# Patient Record
Sex: FEMALE | Race: WHITE | Employment: UNEMPLOYED | URBAN - METROPOLITAN AREA
[De-identification: names, ages, dates, MRNs, and addresses within clinical notes are randomized per-mention and may not be internally consistent; named-entity substitution may affect disease eponyms.]

---

## 2019-01-09 LAB
BASOPHILS ABSOLUTE: 0 K/UL (ref 0–0.2)
BASOPHILS RELATIVE PERCENT: 0.4 %
EOSINOPHILS ABSOLUTE: 0.2 K/UL (ref 0–0.6)
EOSINOPHILS RELATIVE PERCENT: 2.6 %
HCT VFR BLD CALC: 34.7 % (ref 36–48)
HEMOGLOBIN: 11.1 G/DL (ref 12–16)
LYMPHOCYTES ABSOLUTE: 1.9 K/UL (ref 1–5.1)
LYMPHOCYTES RELATIVE PERCENT: 23.3 %
MCH RBC QN AUTO: 27.2 PG (ref 26–34)
MCHC RBC AUTO-ENTMCNC: 31.9 G/DL (ref 31–36)
MCV RBC AUTO: 85.3 FL (ref 80–100)
MONOCYTES ABSOLUTE: 0.4 K/UL (ref 0–1.3)
MONOCYTES RELATIVE PERCENT: 5.2 %
NEUTROPHILS ABSOLUTE: 5.5 K/UL (ref 1.7–7.7)
NEUTROPHILS RELATIVE PERCENT: 68.5 %
PDW BLD-RTO: 16.9 % (ref 12.4–15.4)
PLATELET # BLD: 188 K/UL (ref 135–450)
PMV BLD AUTO: 12.1 FL (ref 5–10.5)
RBC # BLD: 4.07 M/UL (ref 4–5.2)
TSH SERPL DL<=0.05 MIU/L-ACNC: 0.97 UIU/ML (ref 0.27–4.2)
WBC # BLD: 8.1 K/UL (ref 4–11)

## 2019-04-09 RX ORDER — METAXALONE 800 MG/1
800 TABLET ORAL 3 TIMES DAILY
COMMUNITY

## 2019-04-09 RX ORDER — MONTELUKAST SODIUM 10 MG/1
10 TABLET ORAL
COMMUNITY

## 2019-04-09 RX ORDER — OMEGA-3 FATTY ACIDS/FISH OIL 300-1000MG
CAPSULE ORAL DAILY
COMMUNITY

## 2019-04-09 RX ORDER — GABAPENTIN 300 MG/1
600 CAPSULE ORAL 3 TIMES DAILY
COMMUNITY

## 2019-04-09 NOTE — PROGRESS NOTES
Name_______________________________________Printed:____________________  Date and time of surgery__4/12/19 @ 1100_____________________Arrival Time:___0930 main hosp_____________   1. Do not eat or drink anything after 12 midnight (or____hours) prior to surgery. This includes no water, chewing gum or mints. Endoscopy patients follow your doctors bowel prep instructions,which may include taking part of prep after midnight. 2. Take the following pills with a small sip of water on the morning of surgery___singulair__________________________________________________   3. Aspirin, Ibuprofen, Advil, Naproxen, Vitamin E and other Anti-inflammatory products should be stopped for 5 days before surgery or as directed by your physician. 4. Check with your Doctor regarding stopping Plavix, Coumadin,Eliquis, Lovenox,Effient,Pradaxa,Xarelto, Fragmin or other blood thinners and follow their instructions. 5. Do not smoke, and do not drink any alcoholic beverages 24 hours prior to surgery. This includes NA Beer. Refrain from the usage of any recreational drugs. 6. You may brush your teeth and gargle the morning of surgery. DO NOT SWALLOW WATER   7. You MUST make arrangements for a responsible adult to stay on site while you are here and take you home after your surgery. You will not be allowed to leave alone or drive yourself home. It is strongly suggested someone stay with you the first 24 hrs. Your surgery will be cancelled if you do not have a ride home. 8. A parent/legal guardian must accompany a child scheduled for surgery and plan to stay at the hospital until the child is discharged. Please do not bring other children with you. 9. Please wear simple, loose fitting clothing to the hospital.  Ralph Salazarer not bring valuables (money, credit cards, checkbooks, etc.) Do not wear any makeup (including no eye makeup) or nail polish on your fingers or toes. 10. DO NOT wear any jewelry or piercings on day of surgery. All body piercing jewelry must be removed. 11. If you have ___dentures, they will be removed before going to the OR; we will provide you a container. If you wear ___contact lenses or __x_glasses, they will be removed; please bring a case for them. 12. Please see your family doctor/pediatrician for a history & physical and/or concerning medications. Bring any test results/reports from your physician's office. PCP_____wolf_____________Phone___________H&P Appt. Date________             13 If you  have a Living Will and Durable Power of  for Healthcare, please bring in a copy. 15. Notify your Surgeon if you develop any illness between now and surgery  time, cough, cold, fever, sore throat, nausea, vomiting, etc.  Please notify your surgeon if you experience dizziness, shortness of breath or blurred vision between now & the time of your surgery             15. DO NOT shave your operative site 96 hours prior to surgery. For face & neck surgery, men may use an electric razor 48 hours prior to surgery. 16. Shower the night before surgery with __x_Antibacterial soap ___Hibiclens             17. To provide excellent care visitors will be limited to one in the room at any given time. 18.  Please bring picture ID and insurance card. 19.  Visit our web site for additional information:  [email or web address]              20.During flu season no children under the age of 15 are permitted in the hospital for the safety of all patients. 21. If you take a long acting insulin in the evening only  take half of your usual  dose the night  before your procedure              22. If you use a c-pap please bring DOS if staying overnight,             23.For your convenience [facility] has a pharmacy on site to fill your prescriptions.              24. If you use oxygen and have a portable tank please bring it  with you the DOS 22. Bring a complete list of all your medications with name and dose include any supplements. 26. Other__________________________________________   *Please call pre admission testing if you any further questions   [facility]   [address]    [place] [address] [facility]  [phone]   [address] [place] [address]       All above information reviewed with patient in person or by phone. Patient verbalizes understanding. All questions and concerns addressed.                                                                                                  Patient/Rep______pt______________                                                                                                                                    PRE OP INSTRUCTIONS

## 2019-04-12 ENCOUNTER — ANESTHESIA (OUTPATIENT)
Dept: OPERATING ROOM | Age: 39
End: 2019-04-12
Payer: COMMERCIAL

## 2019-04-12 ENCOUNTER — HOSPITAL ENCOUNTER (OUTPATIENT)
Age: 39
Setting detail: OUTPATIENT SURGERY
Discharge: HOME | End: 2019-04-12
Attending: OBSTETRICS & GYNECOLOGY | Admitting: OBSTETRICS & GYNECOLOGY
Payer: COMMERCIAL

## 2019-04-12 ENCOUNTER — ANESTHESIA EVENT (OUTPATIENT)
Dept: OPERATING ROOM | Age: 39
End: 2019-04-12
Payer: COMMERCIAL

## 2019-04-12 VITALS
DIASTOLIC BLOOD PRESSURE: 81 MMHG | BODY MASS INDEX: 45.99 KG/M2 | SYSTOLIC BLOOD PRESSURE: 129 MMHG | TEMPERATURE: 97.8 F | WEIGHT: 293 LBS | HEIGHT: 67 IN | OXYGEN SATURATION: 99 % | RESPIRATION RATE: 16 BRPM | HEART RATE: 76 BPM

## 2019-04-12 VITALS
DIASTOLIC BLOOD PRESSURE: 91 MMHG | OXYGEN SATURATION: 100 % | RESPIRATION RATE: 15 BRPM | TEMPERATURE: 97.2 F | SYSTOLIC BLOOD PRESSURE: 178 MMHG

## 2019-04-12 DIAGNOSIS — G89.18 POSTOPERATIVE PAIN: Primary | ICD-10-CM

## 2019-04-12 LAB — HCG(URINE) PREGNANCY TEST: NEGATIVE

## 2019-04-12 PROCEDURE — 3700000000 HC ANESTHESIA ATTENDED CARE: Performed by: OBSTETRICS & GYNECOLOGY

## 2019-04-12 PROCEDURE — 88305 TISSUE EXAM BY PATHOLOGIST: CPT

## 2019-04-12 PROCEDURE — 6360000002 HC RX W HCPCS: Performed by: NURSE ANESTHETIST, CERTIFIED REGISTERED

## 2019-04-12 PROCEDURE — 7100000000 HC PACU RECOVERY - FIRST 15 MIN: Performed by: OBSTETRICS & GYNECOLOGY

## 2019-04-12 PROCEDURE — 7100000001 HC PACU RECOVERY - ADDTL 15 MIN: Performed by: OBSTETRICS & GYNECOLOGY

## 2019-04-12 PROCEDURE — 2580000003 HC RX 258: Performed by: ANESTHESIOLOGY

## 2019-04-12 PROCEDURE — 3600000014 HC SURGERY LEVEL 4 ADDTL 15MIN: Performed by: OBSTETRICS & GYNECOLOGY

## 2019-04-12 PROCEDURE — 2500000003 HC RX 250 WO HCPCS: Performed by: NURSE ANESTHETIST, CERTIFIED REGISTERED

## 2019-04-12 PROCEDURE — 6370000000 HC RX 637 (ALT 250 FOR IP)

## 2019-04-12 PROCEDURE — 2720000010 HC SURG SUPPLY STERILE: Performed by: OBSTETRICS & GYNECOLOGY

## 2019-04-12 PROCEDURE — 2580000003 HC RX 258: Performed by: OBSTETRICS & GYNECOLOGY

## 2019-04-12 PROCEDURE — 2709999900 HC NON-CHARGEABLE SUPPLY: Performed by: OBSTETRICS & GYNECOLOGY

## 2019-04-12 PROCEDURE — 3600000004 HC SURGERY LEVEL 4 BASE: Performed by: OBSTETRICS & GYNECOLOGY

## 2019-04-12 PROCEDURE — 6360000002 HC RX W HCPCS

## 2019-04-12 PROCEDURE — 7100000010 HC PHASE II RECOVERY - FIRST 15 MIN: Performed by: OBSTETRICS & GYNECOLOGY

## 2019-04-12 PROCEDURE — 3700000001 HC ADD 15 MINUTES (ANESTHESIA): Performed by: OBSTETRICS & GYNECOLOGY

## 2019-04-12 PROCEDURE — 84703 CHORIONIC GONADOTROPIN ASSAY: CPT

## 2019-04-12 PROCEDURE — 6360000002 HC RX W HCPCS: Performed by: OBSTETRICS & GYNECOLOGY

## 2019-04-12 PROCEDURE — 7100000011 HC PHASE II RECOVERY - ADDTL 15 MIN: Performed by: OBSTETRICS & GYNECOLOGY

## 2019-04-12 RX ORDER — SODIUM CHLORIDE 9 MG/ML
INJECTION, SOLUTION INTRAVENOUS CONTINUOUS
Status: DISCONTINUED | OUTPATIENT
Start: 2019-04-12 | End: 2019-04-12 | Stop reason: HOSPADM

## 2019-04-12 RX ORDER — HYDRALAZINE HYDROCHLORIDE 20 MG/ML
5 INJECTION INTRAMUSCULAR; INTRAVENOUS EVERY 10 MIN PRN
Status: DISCONTINUED | OUTPATIENT
Start: 2019-04-12 | End: 2019-04-12 | Stop reason: HOSPADM

## 2019-04-12 RX ORDER — LABETALOL HYDROCHLORIDE 5 MG/ML
5 INJECTION, SOLUTION INTRAVENOUS EVERY 10 MIN PRN
Status: DISCONTINUED | OUTPATIENT
Start: 2019-04-12 | End: 2019-04-12 | Stop reason: HOSPADM

## 2019-04-12 RX ORDER — HYDROMORPHONE HCL 110MG/55ML
0.25 PATIENT CONTROLLED ANALGESIA SYRINGE INTRAVENOUS EVERY 5 MIN PRN
Status: DISCONTINUED | OUTPATIENT
Start: 2019-04-12 | End: 2019-04-12 | Stop reason: HOSPADM

## 2019-04-12 RX ORDER — SODIUM CHLORIDE 0.9 % (FLUSH) 0.9 %
10 SYRINGE (ML) INJECTION PRN
Status: DISCONTINUED | OUTPATIENT
Start: 2019-04-12 | End: 2019-04-12 | Stop reason: HOSPADM

## 2019-04-12 RX ORDER — IBUPROFEN 800 MG/1
800 TABLET ORAL EVERY 6 HOURS PRN
Qty: 40 TABLET | Refills: 3 | Status: SHIPPED | OUTPATIENT
Start: 2019-04-12

## 2019-04-12 RX ORDER — ONDANSETRON 2 MG/ML
4 INJECTION INTRAMUSCULAR; INTRAVENOUS PRN
Status: DISCONTINUED | OUTPATIENT
Start: 2019-04-12 | End: 2019-04-12 | Stop reason: HOSPADM

## 2019-04-12 RX ORDER — HYDROMORPHONE HCL 110MG/55ML
0.5 PATIENT CONTROLLED ANALGESIA SYRINGE INTRAVENOUS EVERY 5 MIN PRN
Status: DISCONTINUED | OUTPATIENT
Start: 2019-04-12 | End: 2019-04-12 | Stop reason: HOSPADM

## 2019-04-12 RX ORDER — SODIUM CHLORIDE 0.9 % (FLUSH) 0.9 %
10 SYRINGE (ML) INJECTION EVERY 12 HOURS SCHEDULED
Status: DISCONTINUED | OUTPATIENT
Start: 2019-04-12 | End: 2019-04-12 | Stop reason: HOSPADM

## 2019-04-12 RX ORDER — PROPOFOL 10 MG/ML
INJECTION, EMULSION INTRAVENOUS PRN
Status: DISCONTINUED | OUTPATIENT
Start: 2019-04-12 | End: 2019-04-12 | Stop reason: SDUPTHER

## 2019-04-12 RX ORDER — SODIUM CHLORIDE, SODIUM LACTATE, POTASSIUM CHLORIDE, CALCIUM CHLORIDE 600; 310; 30; 20 MG/100ML; MG/100ML; MG/100ML; MG/100ML
INJECTION, SOLUTION INTRAVENOUS CONTINUOUS
Status: DISCONTINUED | OUTPATIENT
Start: 2019-04-12 | End: 2019-04-12 | Stop reason: HOSPADM

## 2019-04-12 RX ORDER — LIDOCAINE HYDROCHLORIDE 10 MG/ML
1 INJECTION, SOLUTION EPIDURAL; INFILTRATION; INTRACAUDAL; PERINEURAL
Status: DISCONTINUED | OUTPATIENT
Start: 2019-04-12 | End: 2019-04-12 | Stop reason: HOSPADM

## 2019-04-12 RX ORDER — CIPROFLOXACIN 2 MG/ML
400 INJECTION, SOLUTION INTRAVENOUS ONCE
Status: COMPLETED | OUTPATIENT
Start: 2019-04-12 | End: 2019-04-12

## 2019-04-12 RX ORDER — DEXAMETHASONE SODIUM PHOSPHATE 4 MG/ML
INJECTION, SOLUTION INTRA-ARTICULAR; INTRALESIONAL; INTRAMUSCULAR; INTRAVENOUS; SOFT TISSUE PRN
Status: DISCONTINUED | OUTPATIENT
Start: 2019-04-12 | End: 2019-04-12 | Stop reason: SDUPTHER

## 2019-04-12 RX ORDER — KETOROLAC TROMETHAMINE 30 MG/ML
INJECTION, SOLUTION INTRAMUSCULAR; INTRAVENOUS PRN
Status: DISCONTINUED | OUTPATIENT
Start: 2019-04-12 | End: 2019-04-12 | Stop reason: SDUPTHER

## 2019-04-12 RX ORDER — HYDROMORPHONE HCL 110MG/55ML
PATIENT CONTROLLED ANALGESIA SYRINGE INTRAVENOUS
Status: COMPLETED
Start: 2019-04-12 | End: 2019-04-12

## 2019-04-12 RX ORDER — ONDANSETRON 2 MG/ML
4 INJECTION INTRAMUSCULAR; INTRAVENOUS
Status: DISCONTINUED | OUTPATIENT
Start: 2019-04-12 | End: 2019-04-12 | Stop reason: HOSPADM

## 2019-04-12 RX ORDER — FENTANYL CITRATE 50 UG/ML
INJECTION, SOLUTION INTRAMUSCULAR; INTRAVENOUS PRN
Status: DISCONTINUED | OUTPATIENT
Start: 2019-04-12 | End: 2019-04-12 | Stop reason: SDUPTHER

## 2019-04-12 RX ORDER — LIDOCAINE HYDROCHLORIDE 20 MG/ML
INJECTION, SOLUTION INFILTRATION; PERINEURAL PRN
Status: DISCONTINUED | OUTPATIENT
Start: 2019-04-12 | End: 2019-04-12 | Stop reason: SDUPTHER

## 2019-04-12 RX ORDER — OXYCODONE HYDROCHLORIDE 5 MG/1
5 TABLET ORAL PRN
Status: DISCONTINUED | OUTPATIENT
Start: 2019-04-12 | End: 2019-04-12 | Stop reason: HOSPADM

## 2019-04-12 RX ORDER — FENTANYL CITRATE 50 UG/ML
25 INJECTION, SOLUTION INTRAMUSCULAR; INTRAVENOUS EVERY 5 MIN PRN
Status: DISCONTINUED | OUTPATIENT
Start: 2019-04-12 | End: 2019-04-12 | Stop reason: HOSPADM

## 2019-04-12 RX ORDER — OXYCODONE HYDROCHLORIDE AND ACETAMINOPHEN 5; 325 MG/1; MG/1
TABLET ORAL
Status: COMPLETED
Start: 2019-04-12 | End: 2019-04-12

## 2019-04-12 RX ORDER — OXYCODONE HYDROCHLORIDE AND ACETAMINOPHEN 5; 325 MG/1; MG/1
1 TABLET ORAL ONCE
Status: COMPLETED | OUTPATIENT
Start: 2019-04-12 | End: 2019-04-12

## 2019-04-12 RX ORDER — OXYCODONE HYDROCHLORIDE 5 MG/1
10 TABLET ORAL PRN
Status: DISCONTINUED | OUTPATIENT
Start: 2019-04-12 | End: 2019-04-12 | Stop reason: HOSPADM

## 2019-04-12 RX ORDER — MEPERIDINE HYDROCHLORIDE 25 MG/ML
12.5 INJECTION INTRAMUSCULAR; INTRAVENOUS; SUBCUTANEOUS EVERY 5 MIN PRN
Status: DISCONTINUED | OUTPATIENT
Start: 2019-04-12 | End: 2019-04-12 | Stop reason: HOSPADM

## 2019-04-12 RX ORDER — OXYCODONE HYDROCHLORIDE AND ACETAMINOPHEN 5; 325 MG/1; MG/1
1 TABLET ORAL
Status: DISCONTINUED | OUTPATIENT
Start: 2019-04-12 | End: 2019-04-12 | Stop reason: HOSPADM

## 2019-04-12 RX ORDER — MIDAZOLAM HYDROCHLORIDE 1 MG/ML
INJECTION INTRAMUSCULAR; INTRAVENOUS PRN
Status: DISCONTINUED | OUTPATIENT
Start: 2019-04-12 | End: 2019-04-12 | Stop reason: SDUPTHER

## 2019-04-12 RX ORDER — CIPROFLOXACIN 2 MG/ML
400 INJECTION, SOLUTION INTRAVENOUS
Status: DISCONTINUED | OUTPATIENT
Start: 2019-04-12 | End: 2019-04-12 | Stop reason: HOSPADM

## 2019-04-12 RX ORDER — FENTANYL CITRATE 50 UG/ML
50 INJECTION, SOLUTION INTRAMUSCULAR; INTRAVENOUS EVERY 5 MIN PRN
Status: DISCONTINUED | OUTPATIENT
Start: 2019-04-12 | End: 2019-04-12 | Stop reason: HOSPADM

## 2019-04-12 RX ORDER — OXYCODONE HYDROCHLORIDE AND ACETAMINOPHEN 5; 325 MG/1; MG/1
1 TABLET ORAL EVERY 6 HOURS PRN
Qty: 20 TABLET | Refills: 0 | Status: SHIPPED | OUTPATIENT
Start: 2019-04-12 | End: 2019-04-19

## 2019-04-12 RX ORDER — LIDOCAINE HYDROCHLORIDE 10 MG/ML
0.5 INJECTION, SOLUTION EPIDURAL; INFILTRATION; INTRACAUDAL; PERINEURAL ONCE
Status: DISCONTINUED | OUTPATIENT
Start: 2019-04-12 | End: 2019-04-12 | Stop reason: HOSPADM

## 2019-04-12 RX ORDER — ONDANSETRON 2 MG/ML
INJECTION INTRAMUSCULAR; INTRAVENOUS PRN
Status: DISCONTINUED | OUTPATIENT
Start: 2019-04-12 | End: 2019-04-12 | Stop reason: SDUPTHER

## 2019-04-12 RX ADMIN — PROPOFOL 50 MG: 10 INJECTION, EMULSION INTRAVENOUS at 14:32

## 2019-04-12 RX ADMIN — FENTANYL CITRATE 50 MCG: 50 INJECTION, SOLUTION INTRAMUSCULAR; INTRAVENOUS at 14:35

## 2019-04-12 RX ADMIN — DEXAMETHASONE SODIUM PHOSPHATE 4 MG: 4 INJECTION, SOLUTION INTRAMUSCULAR; INTRAVENOUS at 14:41

## 2019-04-12 RX ADMIN — PROPOFOL 200 MG: 10 INJECTION, EMULSION INTRAVENOUS at 14:28

## 2019-04-12 RX ADMIN — Medication 0.5 MG: at 15:12

## 2019-04-12 RX ADMIN — KETOROLAC TROMETHAMINE 30 MG: 30 INJECTION, SOLUTION INTRAMUSCULAR; INTRAVENOUS at 14:53

## 2019-04-12 RX ADMIN — SODIUM CHLORIDE: 9 INJECTION, SOLUTION INTRAVENOUS at 14:19

## 2019-04-12 RX ADMIN — LIDOCAINE HYDROCHLORIDE 100 MG: 20 INJECTION, SOLUTION INFILTRATION; PERINEURAL at 14:28

## 2019-04-12 RX ADMIN — HYDROMORPHONE HYDROCHLORIDE 0.5 MG: 2 INJECTION, SOLUTION INTRAMUSCULAR; INTRAVENOUS; SUBCUTANEOUS at 15:12

## 2019-04-12 RX ADMIN — SODIUM CHLORIDE, POTASSIUM CHLORIDE, SODIUM LACTATE AND CALCIUM CHLORIDE: 600; 310; 30; 20 INJECTION, SOLUTION INTRAVENOUS at 10:17

## 2019-04-12 RX ADMIN — OXYCODONE AND ACETAMINOPHEN 1 TABLET: 5; 325 TABLET ORAL at 15:52

## 2019-04-12 RX ADMIN — MIDAZOLAM HYDROCHLORIDE 0.5 MG: 1 INJECTION, SOLUTION INTRAMUSCULAR; INTRAVENOUS at 14:35

## 2019-04-12 RX ADMIN — ONDANSETRON 4 MG: 2 INJECTION INTRAMUSCULAR; INTRAVENOUS at 14:41

## 2019-04-12 RX ADMIN — MIDAZOLAM HYDROCHLORIDE 0.5 MG: 1 INJECTION, SOLUTION INTRAMUSCULAR; INTRAVENOUS at 14:19

## 2019-04-12 RX ADMIN — OXYCODONE HYDROCHLORIDE AND ACETAMINOPHEN 1 TABLET: 5; 325 TABLET ORAL at 15:52

## 2019-04-12 RX ADMIN — CIPROFLOXACIN 400 MG: 2 INJECTION, SOLUTION INTRAVENOUS at 13:29

## 2019-04-12 ASSESSMENT — PULMONARY FUNCTION TESTS
PIF_VALUE: 4
PIF_VALUE: 13
PIF_VALUE: 5
PIF_VALUE: 29
PIF_VALUE: 4
PIF_VALUE: 2
PIF_VALUE: 28
PIF_VALUE: 13
PIF_VALUE: 14
PIF_VALUE: 14
PIF_VALUE: 22
PIF_VALUE: 13
PIF_VALUE: 2
PIF_VALUE: 13
PIF_VALUE: 16
PIF_VALUE: 2
PIF_VALUE: 13
PIF_VALUE: 9
PIF_VALUE: 12
PIF_VALUE: 13
PIF_VALUE: 14
PIF_VALUE: 4
PIF_VALUE: 11
PIF_VALUE: 12
PIF_VALUE: 3
PIF_VALUE: 3
PIF_VALUE: 4
PIF_VALUE: 13
PIF_VALUE: 14
PIF_VALUE: 15
PIF_VALUE: 2
PIF_VALUE: 13
PIF_VALUE: 4
PIF_VALUE: 12
PIF_VALUE: 20
PIF_VALUE: 13
PIF_VALUE: 14
PIF_VALUE: 14
PIF_VALUE: 16
PIF_VALUE: 13
PIF_VALUE: 3
PIF_VALUE: 2
PIF_VALUE: 0
PIF_VALUE: 0
PIF_VALUE: 3
PIF_VALUE: 1

## 2019-04-12 ASSESSMENT — PAIN SCALES - GENERAL
PAINLEVEL_OUTOF10: 5
PAINLEVEL_OUTOF10: 4
PAINLEVEL_OUTOF10: 7
PAINLEVEL_OUTOF10: 2
PAINLEVEL_OUTOF10: 7

## 2019-04-12 NOTE — H&P
[place] [address]                     [facility], [address]                              HISTORY AND PHYSICAL    PATIENT NAME: Vinh Miller                      :        1980  MED REC NO:   [id number]                          ROOM:  ACCOUNT NO:   [de-identified]                           ADMIT DATE: 2019  PROVIDER:     Adelita Lynn DO    HISTORY OF PRESENT ILLNESS:  She is a 16-year-old female who is  presenting for NovaSure endometrial ablation, possible hydrothermal  ablation for heavy vaginal bleeding. She is a  3, para 3. She  has had 3  sections. Her bleeding began 2 years ago. She  bleeds anywhere from 1 week to 3 weeks, heavy bright red with clots. The frequency of the bleeding is irregular. Ultrasound in the office  demonstrated the uterus to be 6 x 6 x 5 with a 24 mm endometrial  thickness. Right ovary was unremarkable. Left ovary was not  visualized. MEDICATIONS:  Cytotec, hydrochlorothiazide, metaxalone, lisinopril,  omega-3 release fatty acids, and iron. FAMILY HISTORY:  Remarkable for cervical cancer, coronary artery  disease, hypercholesterolemia, and hypertension. PAST MEDICAL HISTORY:  She has had acid reflux, anemia, dysmenorrhea,  dyspareunia, frequent UTIs, fibroids, hypertension, hyperlipidemia,  renal stones, and menorrhagia. DIAGNOSTIC STUDIES:  She had endometrial biopsies prior to this workup,  which was benign. Saline demonstrated enlarged anteverted uterus with  one small fibroid. Endometrium was 2.4 cm in thickness. Ovaries were  unremarkable. The only thing remarkable about her saline is that  posterior wall of the uterus was thickened. PHYSICAL EXAMINATION:  VITAL SIGNS:  She is 5 feet 6 inches, weighs 353 pounds, blood pressure  is 132/90. GENERAL:  She is morbidly obese. HEENT:  Otherwise unremarkable. LUNGS:  Clear. HEART:  Regular rate and rhythm. ABDOMEN:  Benign.   GENITALIA: Uterus as described on ultrasound. IMPRESSION:  Abnormal vaginal bleeding, obesity, not really a hormone  treatment candidate. PLAN:  She is to undergo endometrial ablation.         Kenney Zamarripa DO    D: 04/11/2019 15:06:04       T: 04/11/2019 18:22:54     JOSE [email or web address]  Job#: [id number]     Doc#: [id number]    CC:

## 2019-04-12 NOTE — PROCEDURES
Department of Obstetrics and Gynecology   Brief Operative Report        Pre-operative Diagnosis:  Menorrhagia, morbid obesity    Post-operative Diagnosis:  Same    Procedure: hysteroscopy, D&C, novasure ablation    Surgeon:  Oswaldo Avery     Assistant(s): none    Anesthesia:  LMA    Findings: see note    Estimated blood loss:  min    Specimens: curettings. Complications:  none    Condition:  stable        See dictated operative report for full details.

## 2019-04-12 NOTE — PROGRESS NOTES
Pt moved to phase II. Pt alert and oriented. Room air. Vss. Pt stable. Pt no longer tearful. Parents at bedside. Pt eating pudding and drinking soda.

## 2019-04-12 NOTE — PROGRESS NOTES
Patient education given and the patient expresses understanding and acceptance of instructions. Kendra Joyner 4/12/2019 3:44 PM  Discharge instructions reviewed with patient/responsible adult. All home medications have been reviewed, questions answered and patient verbalized understanding. Discharge instructions signed and copies given.

## 2019-04-12 NOTE — ANESTHESIA POSTPROCEDURE EVALUATION
Department of Anesthesiology  Postprocedure Note    Patient: Jordin Martinez  MRN: [id number]  YOB: 1980  Date of evaluation: 4/12/2019  Time:  3:10 PM     Procedure Summary     Date:  04/12/19 Room / Location:  [facility] OR  / [facility] OR    Anesthesia Start:  1419 Anesthesia Stop:  1507    Procedure:  Joanna Sprout ENDOMETRIAL ABLATION; DILATATION AND CURETTAGE (N/A Vagina ) Diagnosis:  (N92.0 MENORRHAGIA)    Surgeon:  Erik Elena DO Responsible Provider:  Cristian Daniels DO    Anesthesia Type:  general ASA Status:  3          Anesthesia Type: general    Farzaneh Phase I:      Farzaneh Phase II:      Last vitals: Reviewed and per EMR flowsheets.        Anesthesia Post Evaluation    Patient location during evaluation: PACU  Patient participation: complete - patient participated  Level of consciousness: awake and alert  Airway patency: patent  Nausea & Vomiting: no nausea and no vomiting  Complications: no  Cardiovascular status: blood pressure returned to baseline  Respiratory status: acceptable  Hydration status: euvolemic

## 2019-04-12 NOTE — H&P
I have reviewed the history and physical and examined the patient and find no relevant changes. I have reviewed with the patient and/or family the risks, benefits, and alternatives to the procedure.     Avril Delcid DO  4/12/2019

## 2019-04-12 NOTE — ANESTHESIA PRE PROCEDURE
Department of Anesthesiology  Preprocedure Note       Name:  Josef Duarte   Age:  41 y.o.  :  1980                                          MRN:  [id number]         Date:  2019      Surgeon: Kevin Guerrier):  Avril Delcid DO    Procedure: Lora Bevels ENDOMETRIAL ABLATION; POSSIBLE HYDROTHERMAL ENDOMETRIAL ABLATION (N/A Vagina )    Medications prior to admission:   Prior to Admission medications    Medication Sig Start Date End Date Taking? Authorizing Provider   montelukast (SINGULAIR) 10 MG tablet Take 10 mg by mouth every morning (before breakfast)   Yes Historical Provider, MD   Omega 3 1000 MG CAPS Take by mouth daily   Yes Historical Provider, MD   metaxalone (SKELAXIN) 800 MG tablet Take 800 mg by mouth 3 times daily   Yes Historical Provider, MD   gabapentin (NEURONTIN) 300 MG capsule Take 600 mg by mouth 3 times daily. Yes Historical Provider, MD   lisinopril (PRINIVIL;ZESTRIL) 20 MG tablet Take 10 mg by mouth daily     Historical Provider, MD   hydrochlorothiazide (HYDRODIURIL) 25 MG tablet Take 25 mg by mouth daily. Historical Provider, MD   albuterol (PROVENTIL HFA;VENTOLIN HFA) 108 (90 BASE) MCG/ACT inhaler Inhale 2 puffs into the lungs every 6 hours as needed for Wheezing. Historical Provider, MD   Polysaccharide Iron Complex (PROFE) 391.3 (180 FE) MG CAPS Take 1 capsule by mouth daily. Historical Provider, MD       Current medications:    No current facility-administered medications for this encounter.         Allergies:  No Known Allergies    Problem List:    Patient Active Problem List   Diagnosis Code    Chest pain R07.9    Abnormal nuclear stress test R94.39    Hypertension I10    Tobacco abuse Z72.0       Past Medical History:        Diagnosis Date    Abnormal nuclear stress test     Chest pain     Diabetes mellitus ([facility] [address]     pre    HLD (hyperlipidemia)     Hypertension     Obesity     Tobacco abuse        Past Surgical History:        Procedure Laterality Date    notes reviewed  Airway: Mallampati: II  TM distance: >3 FB   Neck ROM: full  Mouth opening: > = 3 FB Dental: normal exam         Pulmonary:normal exam    (+) asthma:                            Cardiovascular:  Exercise tolerance: poor (<4 METS),   (+) hypertension:, hyperlipidemia                  Neuro/Psych:               GI/Hepatic/Renal:   (+) morbid obesity          Endo/Other:    (+) Diabetes, . Abdominal:   (+) obese,         Vascular:                                        Anesthesia Plan      general     ASA 3       Induction: intravenous. MIPS: Postoperative opioids intended, Prophylactic antiemetics administered and Postoperative trial extubation. Anesthetic plan and risks discussed with patient. Use of blood products discussed with patient whom consented to blood products. Plan discussed with CRNA.                   Geoffrey Ortiz MD   4/12/2019

## 2019-04-13 NOTE — OP NOTE
[address]                     [facility], [address] [place] [address]                                OPERATIVE REPORT    PATIENT NAME: Amauri Wakefeild                      :        1980  MED REC NO:   [id number]                          ROOM:  ACCOUNT NO:   [de-identified]                           ADMIT DATE: 2019  PROVIDER:     Mahi Mcmullen DO    DATE OF PROCEDURE:  2019    PREOPERATIVE DIAGNOSES:  Menorrhagia, morbid obesity. POSTOPERATIVE DIAGNOSES:  Menorrhagia, morbid obesity. OPERATION PERFORMED:  Hysteroscopy, D and C< NovaSure ablation. SURGEON:  Mahi Mcmullen DO    ANESTHESIA:  LMA general.    OPERATIVE PROCEDURE:  After informed consent was signed, this  81-year-old white female was taken to the operative suite, placed in the  dorsal lithotomy position. She had her perineum and vagina  appropriately prepped and draped and her bladder emptied sterilely. A  side opening speculum was placed in the vagina. The anterior cervical  lip was grasped with a single-tooth tenaculum. The cervix was dilated  enough to allow placement of sound. The uterus sounded to 8-9. Hysteroscope was placed. Cavity was difficult to visualize due to the  clots. Curettage was performed. Again the uterus sounded to about 8-9. The NovaSure was placed. The width was 3.5. Cavity integrity check was  performed and passed. After the heating cycle was completed, the device  was removed. Hysteroscopy was performed. There was good thermal  ablation seen throughout the uterine cavity. The patient tolerated the  procedure well. Discharge medications will be Motrin and Percocet. She  will follow in the office in 2 weeks.         Lg Garner DO    D: 2019 15:08:49       T: 2019 0:14:15     JOSE [email or web address]  Job#: [id number]     Doc#: [id number]    CC:

## (undated) DEVICE — KIT OR ROOM TURNOVER W/STRAP

## (undated) DEVICE — Z DISCONTINUED GLOVE SURG SZ 7.5 L12IN FNGR THK13MIL WHT ISOLEX

## (undated) DEVICE — SET EXTN PRIMING 4.9ML L30IN INCL SLDE CLMP SPIN M LUERLOCK

## (undated) DEVICE — Z INACTIVE USE 2660664 SOLUTION IRRIG 3000ML 0.9% SOD CHL USP UROMATIC PLAS CONT

## (undated) DEVICE — KIT THERMOABLATION 6MM ENDOMET DEV NOVASURE

## (undated) DEVICE — SET IRRIG L94IN DIA0.281IN L BOR W/ 2 N VENT SPIK 2 ON/OFF

## (undated) DEVICE — FAIRFIELD D&C: Brand: MEDLINE INDUSTRIES, INC.